# Patient Record
(demographics unavailable — no encounter records)

---

## 2024-12-18 NOTE — REVIEW OF SYSTEMS
[NI] : Endocrine [Nl] : Hematologic/Lymphatic [Joint Pains] : arthralgias [AM Stiffness] : am stiffness [Fever] : no fever [Rash] : no rash [Abdominal Pain] : no abdominal pain [Joint Swelling] : no joint swelling [Back Pain] : ~T no back pain [Smokers in Home] : no one in home smokes

## 2024-12-18 NOTE — PHYSICAL EXAM
[PERRLA] : MYRA [Pupils] : pupils were equal and round [Ears] : normal ears [Gums] : normal gums [Palate] : normal palate [S1, S2 Present] : S1, S2 present [Cardiac Auscultation] : normal cardiac auscultation  [Respiratory Effort] : normal respiratory effort [Clear to auscultation] : clear to auscultation [Soft] : soft [NonTender] : non tender [Non Distended] : non distended [Normal Bowel Sounds] : normal bowel sounds [No Hepatosplenomegaly] : no hepatosplenomegaly [Refer to Joint Diagram Below] : refer to joint diagram below [No Abnormal Lymph Nodes Palpated] : no abnormal lymph nodes palpated [Range Of Motion] : full range of motion [Gait] : normal gait [Joint effusions] : joint effusions [Intact Judgement] : intact judgement  [Insight Insight] : intact insight [1] : 1 [_______] : HIP: [unfilled] [Acute distress] : no acute distress [Rash] : no rash [Erythematous Conjunctiva] : nonerythematous conjunctiva [Erythematous Oropharynx] : nonerythematous oropharynx [Lesions] : no lesions [Erythematous] : not erythematous [Murmurs] : no murmurs [Peripheral Edema] : no peripheral edema  [FreeTextEntry1] : looks well, in no distress [de-identified] :  overgrowth R ankle with effn/ fingers sl stiff in that they don't quite all bend fully/ both knees not quite straight [de-identified] : marked overgrowth of tibial tuberosities

## 2024-12-18 NOTE — PHYSICAL EXAM
[PERRLA] : MYRA [Pupils] : pupils were equal and round [Ears] : normal ears [Gums] : normal gums [Palate] : normal palate [S1, S2 Present] : S1, S2 present [Cardiac Auscultation] : normal cardiac auscultation  [Respiratory Effort] : normal respiratory effort [Clear to auscultation] : clear to auscultation [Soft] : soft [NonTender] : non tender [Non Distended] : non distended [Normal Bowel Sounds] : normal bowel sounds [No Hepatosplenomegaly] : no hepatosplenomegaly [No Abnormal Lymph Nodes Palpated] : no abnormal lymph nodes palpated [Refer to Joint Diagram Below] : refer to joint diagram below [Range Of Motion] : full range of motion [Gait] : normal gait [Joint effusions] : joint effusions [Intact Judgement] : intact judgement  [Insight Insight] : intact insight [1] : 1 [_______] : HIP: [unfilled] [Acute distress] : no acute distress [Rash] : no rash [Erythematous Conjunctiva] : nonerythematous conjunctiva [Erythematous Oropharynx] : nonerythematous oropharynx [Lesions] : no lesions [Erythematous] : not erythematous [Murmurs] : no murmurs [Peripheral Edema] : no peripheral edema  [FreeTextEntry1] : looks well, in no distress [de-identified] :  overgrowth R ankle with effn/ fingers sl stiff in that they don't quite all bend fully/ both knees not quite straight [de-identified] : marked overgrowth of tibial tuberosities

## 2024-12-18 NOTE — HISTORY OF PRESENT ILLNESS
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

## 2024-12-18 NOTE — DISCUSSION/SUMMARY
[FreeTextEntry1] : I reviewed for  this patient clinical status, labs and relevant notes from other providers I also saw the patient and took a full history, completed an exam and discussed the treatment/management and follow up together with the patient.\par  \par

## 2025-04-28 NOTE — PHYSICAL EXAM
[PERRLA] : MYRA [Pupils] : pupils were equal and round [Ears] : normal ears [Gums] : normal gums [Palate] : normal palate [S1, S2 Present] : S1, S2 present [Cardiac Auscultation] : normal cardiac auscultation  [Respiratory Effort] : normal respiratory effort [Clear to auscultation] : clear to auscultation [Soft] : soft [NonTender] : non tender [Non Distended] : non distended [Normal Bowel Sounds] : normal bowel sounds [No Hepatosplenomegaly] : no hepatosplenomegaly [No Abnormal Lymph Nodes Palpated] : no abnormal lymph nodes palpated [Refer to Joint Diagram Below] : refer to joint diagram below [Range Of Motion] : full range of motion [Gait] : normal gait [Joint effusions] : joint effusions [Intact Judgement] : intact judgement  [Insight Insight] : intact insight [1] : 1 [Acute distress] : no acute distress [Rash] : no rash [Erythematous Conjunctiva] : nonerythematous conjunctiva [Erythematous Oropharynx] : nonerythematous oropharynx [Lesions] : no lesions [Erythematous] : not erythematous [Murmurs] : no murmurs [Peripheral Edema] : no peripheral edema  [FreeTextEntry1] : looks well, in no distress [de-identified] :  overgrowth R ankle with effn/ fingers sl stiff in that they don't quite all bend fully/ both knees not quite straight [_______] : Knee: [unfilled]  [de-identified] : marked overgrowth of tibial tuberosities